# Patient Record
Sex: FEMALE | Race: WHITE | URBAN - NONMETROPOLITAN AREA
[De-identification: names, ages, dates, MRNs, and addresses within clinical notes are randomized per-mention and may not be internally consistent; named-entity substitution may affect disease eponyms.]

---

## 2018-03-15 ENCOUNTER — APPOINTMENT (RX ONLY)
Dept: URBAN - NONMETROPOLITAN AREA CLINIC 35 | Facility: CLINIC | Age: 33
Setting detail: DERMATOLOGY
End: 2018-03-15

## 2018-03-15 DIAGNOSIS — Q826 OTHER SPECIFIED ANOMALIES OF SKIN: ICD-10-CM

## 2018-03-15 DIAGNOSIS — L30.8 OTHER SPECIFIED DERMATITIS: ICD-10-CM

## 2018-03-15 DIAGNOSIS — Q819 OTHER SPECIFIED ANOMALIES OF SKIN: ICD-10-CM

## 2018-03-15 DIAGNOSIS — L85.3 XEROSIS CUTIS: ICD-10-CM

## 2018-03-15 DIAGNOSIS — Q828 OTHER SPECIFIED ANOMALIES OF SKIN: ICD-10-CM

## 2018-03-15 DIAGNOSIS — D22 MELANOCYTIC NEVI: ICD-10-CM

## 2018-03-15 DIAGNOSIS — L70.8 OTHER ACNE: ICD-10-CM

## 2018-03-15 DIAGNOSIS — Z12.83 ENCOUNTER FOR SCREENING FOR MALIGNANT NEOPLASM OF SKIN: ICD-10-CM

## 2018-03-15 PROBLEM — D22.39 MELANOCYTIC NEVI OF OTHER PARTS OF FACE: Status: ACTIVE | Noted: 2018-03-15

## 2018-03-15 PROBLEM — D22.5 MELANOCYTIC NEVI OF TRUNK: Status: ACTIVE | Noted: 2018-03-15

## 2018-03-15 PROBLEM — L40.0 PSORIASIS VULGARIS: Status: ACTIVE | Noted: 2018-03-15

## 2018-03-15 PROBLEM — D22.71 MELANOCYTIC NEVI OF RIGHT LOWER LIMB, INCLUDING HIP: Status: ACTIVE | Noted: 2018-03-15

## 2018-03-15 PROBLEM — Q82.8 OTHER SPECIFIED CONGENITAL MALFORMATIONS OF SKIN: Status: ACTIVE | Noted: 2018-03-15

## 2018-03-15 PROCEDURE — ? COUNSELING

## 2018-03-15 PROCEDURE — ? TREATMENT REGIMEN

## 2018-03-15 PROCEDURE — ? OBSERVATION

## 2018-03-15 PROCEDURE — ? PRESCRIPTION

## 2018-03-15 PROCEDURE — ? IN-HOUSE DISPENSING PHARMACY

## 2018-03-15 PROCEDURE — 99203 OFFICE O/P NEW LOW 30 MIN: CPT

## 2018-03-15 PROCEDURE — ? EDUCATIONAL RESOURCES PROVIDED

## 2018-03-15 RX ORDER — TRIAMCINOLONE ACETONIDE 0.1 %
OINTMENT (GRAM) TOPICAL
Qty: 1 | Refills: 1 | Status: ERX | COMMUNITY
Start: 2018-03-15

## 2018-03-15 RX ADMIN — Medication: at 00:00

## 2018-03-15 ASSESSMENT — LOCATION DETAILED DESCRIPTION DERM
LOCATION DETAILED: RIGHT SUPERIOR MEDIAL MIDBACK
LOCATION DETAILED: RIGHT PROXIMAL POSTERIOR UPPER ARM
LOCATION DETAILED: RIGHT PROXIMAL DORSAL RING FINGER
LOCATION DETAILED: LEFT SUPERIOR UPPER BACK
LOCATION DETAILED: RIGHT INFERIOR LATERAL MALAR CHEEK
LOCATION DETAILED: LEFT PROXIMAL DORSAL RING FINGER
LOCATION DETAILED: RIGHT MEDIAL PLANTAR MIDFOOT
LOCATION DETAILED: LEFT PROXIMAL POSTERIOR UPPER ARM

## 2018-03-15 ASSESSMENT — LOCATION ZONE DERM
LOCATION ZONE: ARM
LOCATION ZONE: FACE
LOCATION ZONE: TRUNK
LOCATION ZONE: FINGER
LOCATION ZONE: FEET

## 2018-03-15 ASSESSMENT — LOCATION SIMPLE DESCRIPTION DERM
LOCATION SIMPLE: RIGHT PLANTAR SURFACE
LOCATION SIMPLE: RIGHT RING FINGER
LOCATION SIMPLE: RIGHT LOWER BACK
LOCATION SIMPLE: LEFT RING FINGER
LOCATION SIMPLE: RIGHT CHEEK
LOCATION SIMPLE: RIGHT POSTERIOR UPPER ARM
LOCATION SIMPLE: LEFT POSTERIOR UPPER ARM
LOCATION SIMPLE: LEFT UPPER BACK

## 2018-03-15 NOTE — PROCEDURE: IN-HOUSE DISPENSING PHARMACY
Product 35 Refills: 0
Product 50 Unit Type: mg
Product 18 Unit Type: ml
Product 5 Amount/Unit (Numbers Only): 60
Product 23 Refills: 11
Product 8 Price/Unit (In Dollars): 42.00
Product 6 Price/Unit (In Dollars): 40.00
Product 2 Units Dispensed: 1
Name Of Product 19: Bruise Healing Cream \\nArnica 2%/Ascorbic Acid 20%/Bromelain 5% /Niacinamide 4%/Phytonadione 1%
Product 22 Application Directions: AAA every night
Product 12 Application Directions: Wash the scalp nightly when flared and 3x a week for maintenance. Let sit for several minutes before rinsing
Name Of Product 9: Clob 0.05% Ointment \\nClobetasol Propionate 0.05%/Niacinamide 4%
Product 24 Price/Unit (In Dollars): 45.00
Product 13 Price/Unit (In Dollars): 53.00
Send Charges To Patient Encounter: Yes
Product 1 Amount/Unit (Numbers Only): 30
Product 9 Application Directions: AAA BID x 2-3 weeks out of every month prn
Name Of Product 7: Anti-Fungal Econaz 1% Cream\\nEconazole Nitrate 1%/Niacinamide 4%
Name Of Product 14: Hydrocort 2.5%/Keto 2% Fungal Cream \\nHydrocortisone 2.5%/Ketoconazole 2%
Name Of Product 11: Mometasone 0.1% w/ HA\\nHyaluronic Acid 2%/Mometasone Furoate 0.1% /Niacinamide 4%
Name Of Product 20: Tret 0.05% w/ HA\\nHyaluronic Acid 0.5%/Niacinamide 4%/Tretinoin 0.05%
Product 9 Unit Type: grams
Name Of Product 16: Ivermec 1% / Metron 1% Rosacea Gel \\nIvermectin 1%/Metronidazole 1%/Niacinamide 4% /Potassium Azeloyl Diglycinate 5%
Product 10 Price/Unit (In Dollars): 48.00
Product 5 Application Directions: Apply to scalp once daily.
Name Of Product 6: Anti-Fungal Nail Solution \\nCiclopirox 8%/Fluconazole 1%/Terbinafine 1%
Product 15 Application Directions: Apply 1-2 pumps topically to the face once daily.
Name Of Product 13: Tacro 0.1% Ointment\\nNiacinamide 4%/Tacrolimus 0.1%
Product 24 Application Directions: Apply to face qam
Product 21 Application Directions: Wet skin and apply to the affected areas. Massage gently for 10 seconds, working into a full lather. Rinse well and pat dry.
Product 20 Refills: 6
Name Of Product 23: Latisse
Name Of Product 15: Metron 1% Rosacea Gel \\nMetronidazole 1%/Niacinamide 4%
Product 17 Application Directions: Apply 1-2 pumps topically to the affected areas one or two times daily.
Product 23 Application Directions: Apply to upper lash line nightly
Name Of Product 8: Clob 0.05% Topical Solution \\nClobetasol Propionate 0.05%/Niacinamide 4%
Product 18 Application Directions: Apply to warts HS
Product 12 Amount/Unit (Numbers Only): 120
Name Of Product 10: Desox 0.025% Ointment\\nDesoximatasone 0.25%/Niacinamide 4%
Product 20 Application Directions: Apply topically to face nightly or as tolerated.
Product 8 Refills: 2
Name Of Product 17: Scar Silicone Gel \\nPentoxifylline 0.5%/Tranilast 1%/Triamcinolone Acetonide 0.1%/Zinc Oxide 5%
Name Of Product 4: Alopecia Topical Solution For Men HP\\nFinasteride 0.1%/Minoxidil 7%
Name Of Product 12: Anti-Fungal Keto 2% Shampoo\\nKetoconazole 2%/Zinc Pyrithione 1%/Salicylic Acid 2%
Product 7 Application Directions: Apply to both feet and in between toes qhs
Name Of Product 24: Azelaic Acid Cream\\nAzelaic Acid 15%/Niacinamide 4%
Product 21 Refills: 10
Product 14 Application Directions: AAA 1-2 x daily
Name Of Product 5: Alopecia Topical Solution For Women\\nMinoxidil 7%/Progesterone 0.1%
Product 6 Application Directions: Apply to affected toenails daily
Name Of Product 2: Dapsone 6%/Niacinamide 2%/Spironolactone 5%
Name Of Product 21: Acne Rosacea Face & Body Cleanser \\nSodium Sulfacetamide 8%/Sulfur 4%
Name Of Product 22: [place] Imiquim 5% / Levocetir 1% Gel \\nImiquimod 5%/Levocetirizine 1%/Niacinamide 2%
Product 1 Application Directions: Apply to face every morning.
Name Of Product 1: Dap 6% Acne Gel\\nDapsone 6%/Niacinamide 4%
Detail Level: Simple
Product 13 Application Directions: Apply 1-2 pumps topically to affected areas on face twice daily
Product 16 Application Directions: Apply topically to face 1-2 x daily
Product 2 Refills: 5
Name Of Product 3: BP 5% w/ Tret 0.025% Gel\\nBenzoyl Peroxide 5%/Niacinamide 2%/Tretinoin 0.025%
Product 2 Application Directions: Apply topically to the face every morning
Product 6 Amount/Unit (Numbers Only): 15
Name Of Product 18: Wart Gel \\nCimetidine 5%/Ibuprofen 2%/Salicylic Acid 17%
Product 4 Application Directions: Apply to scalp daily

## 2018-03-15 NOTE — HPI: BUMPS
Have Your Bumps Been Treated?: not been treated
Is This A New Presentation, Or A Follow-Up?: Bumps
Additional History: Pt was told long ago this was Keratosis Pilaris, she has tried Amlactin in the past but this did not help.

## 2018-03-15 NOTE — HPI: SKIN LESION
Has Your Skin Lesion Been Treated?: not been treated
Is This A New Presentation, Or A Follow-Up?: Mole
Additional History: Pt notes this mole is a bit darker than the rest, she has noticed no changes over many years.

## 2018-03-15 NOTE — HPI: PIMPLES (ACNE)
Is This A New Presentation, Or A Follow-Up?: Acne
Additional Comments (Use Complete Sentences): Pt has never had acne before, just in the last few months she has been breaking out primarily along the jawline.  She currently washes her face with Dove soap and a wash cloth and applies Lubriderm lotion and makeup.

## 2018-03-15 NOTE — HPI: RASH
Is This A New Presentation, Or A Follow-Up?: Rash
Additional History: Pt gets breakout on her hands every spring, she believes it is psoriasis, it only ever shows up on her hands.  She has never tried any treatment, it does not particularly bother her.

## 2018-03-15 NOTE — PROCEDURE: TREATMENT REGIMEN
Detail Level: Zone
Detail Level: Detailed
Initiate Treatment: CeraVe SA
Initiate Treatment: [place]//Dapsone gel qd-bid, sulfur cleanser qd-bid
Initiate Treatment: CeraVe moisturizer qd-bid
Initiate Treatment: Gentle cleanser for sensitive skin, moisturize every time after washing hands, TAC bid prn up to 3 weeks of the month
Plan: Check all products for comedogenicity.\\nPatient would like to avoid systemic medications if possible.\\nFollow up in 6 weeks for evaluation of progress, sooner prn if symptoms worsen
Detail Level: Generalized
Discontinue Regimen: Washing with washcloth

## 2018-03-15 NOTE — PROCEDURE: MIPS QUALITY
Quality 130: Documentation Of Current Medications In The Medical Record: Current Medications Documented
Quality 226: Preventive Care And Screening: Tobacco Use: Screening And Cessation Intervention: Patient screened for tobacco and is an ex-smoker
Detail Level: Detailed

## 2019-03-27 ENCOUNTER — APPOINTMENT (RX ONLY)
Dept: URBAN - NONMETROPOLITAN AREA CLINIC 35 | Facility: CLINIC | Age: 34
Setting detail: DERMATOLOGY
End: 2019-03-27

## 2019-03-27 DIAGNOSIS — Z12.83 ENCOUNTER FOR SCREENING FOR MALIGNANT NEOPLASM OF SKIN: ICD-10-CM

## 2019-03-27 DIAGNOSIS — D22 MELANOCYTIC NEVI: ICD-10-CM

## 2019-03-27 DIAGNOSIS — L70.0 ACNE VULGARIS: ICD-10-CM

## 2019-03-27 DIAGNOSIS — D22 MELANOCYTIC NEVI: ICD-10-CM | Status: STABLE

## 2019-03-27 DIAGNOSIS — L81.4 OTHER MELANIN HYPERPIGMENTATION: ICD-10-CM

## 2019-03-27 PROBLEM — D22.5 MELANOCYTIC NEVI OF TRUNK: Status: ACTIVE | Noted: 2019-03-27

## 2019-03-27 PROBLEM — D22.39 MELANOCYTIC NEVI OF OTHER PARTS OF FACE: Status: ACTIVE | Noted: 2019-03-27

## 2019-03-27 PROBLEM — D22.71 MELANOCYTIC NEVI OF RIGHT LOWER LIMB, INCLUDING HIP: Status: ACTIVE | Noted: 2019-03-27

## 2019-03-27 PROCEDURE — ? IN-HOUSE DISPENSING PHARMACY

## 2019-03-27 PROCEDURE — ? COUNSELING

## 2019-03-27 PROCEDURE — ? OBSERVATION

## 2019-03-27 PROCEDURE — 99213 OFFICE O/P EST LOW 20 MIN: CPT

## 2019-03-27 PROCEDURE — ? TREATMENT REGIMEN

## 2019-03-27 PROCEDURE — ? COSMETIC CONSULTATION - PHOTOREJUVENATION

## 2019-03-27 PROCEDURE — ? MEDICAL CONSULTATION: CHEMICAL PEELS

## 2019-03-27 PROCEDURE — ? PRODUCT LINE (HYPERPIGMENTATION)

## 2019-03-27 ASSESSMENT — LOCATION SIMPLE DESCRIPTION DERM
LOCATION SIMPLE: RIGHT PLANTAR SURFACE
LOCATION SIMPLE: RIGHT CHEEK
LOCATION SIMPLE: LEFT CHEEK
LOCATION SIMPLE: UPPER BACK

## 2019-03-27 ASSESSMENT — LOCATION DETAILED DESCRIPTION DERM
LOCATION DETAILED: LEFT INFERIOR CENTRAL MALAR CHEEK
LOCATION DETAILED: SUPERIOR THORACIC SPINE
LOCATION DETAILED: RIGHT CENTRAL MALAR CHEEK
LOCATION DETAILED: RIGHT INFERIOR CENTRAL MALAR CHEEK
LOCATION DETAILED: RIGHT LATERAL PLANTAR MIDFOOT
LOCATION DETAILED: LEFT CENTRAL MALAR CHEEK

## 2019-03-27 ASSESSMENT — LOCATION ZONE DERM
LOCATION ZONE: FEET
LOCATION ZONE: FACE
LOCATION ZONE: TRUNK

## 2019-03-27 ASSESSMENT — SEVERITY ASSESSMENT OVERALL AMONG ALL PATIENTS
IN YOUR EXPERIENCE, AMONG ALL PATIENTS YOU HAVE SEEN WITH THIS CONDITION, HOW SEVERE IS THIS PATIENT'S CONDITION?: NORMAL

## 2019-03-27 NOTE — PROCEDURE: TREATMENT REGIMEN
Continue Regimen: Sulfur Cleanser 1-2 x daily
Initiate Treatment: Antioxidant SPF QAM\\nTret w/ HA QHS as tolerated (mixed with Fade 12 as described above for lentigines)
Detail Level: Zone
Initiate Treatment: Antioxidant sunblock QAM\\nMix Fade 12 (2% hydroquinone) and 0.05%tretinoin with HA QHS as tolerated\\nNoncomedogenic moisturizer
Plan: Follow up for IPL vs. Jessners peels PRN

## 2019-03-27 NOTE — PROCEDURE: PRODUCT LINE (HYPERPIGMENTATION)
Product 46 Units: 0
Name Of Product 6: VITAMIN C LIGHTENING CREAM
Product 36 Price (In Dollars - Numeric Only, No Special Characters Or $): 0.00
Product 2 Price (In Dollars - Numeric Only, No Special Characters Or $): 50.00
Product 4 Application Directions: Apply to face qpm
Name Of Product 4: Tretinol
Product 6 Application Directions: Apply to face QAM
Product 5 Price (In Dollars - Numeric Only, No Special Characters Or $): 79.00
Detail Level: Zone
Allow Plan To Count Towards E/M Coding: Yes
Name Of Product 1: Yusraiant
Product 5 Application Directions: AAA as tolerated
Name Of Product 5: Fade-12
Product 1 Application Directions: Apply to face qohs-qhs as tolerated
Product 1 Price (In Dollars - Numeric Only, No Special Characters Or $): 77.00
Product 2 Application Directions: Apply to face qam under sunscreen
Name Of Product 3: Pure MD June-C Lightening Serum (4% Hydroquinone, 10% Vitamin C, and 1% Vitamin E)
Product 3 Application Directions: Apply to face every morning.
Product 5 Units: 1
Product 6 Price (In Dollars - Numeric Only, No Special Characters Or $): 78.00
Name Of Product 2: Radiant C cream

## 2019-03-27 NOTE — HPI: PREVENTATIVE SKIN CHECK
What Is The Reason For Today's Visit?: Preventative Skin Check
Additional History: Pt denies any new or changing lesions of malignant concern. Pt reports having a mole on her right cheek for years that is slightly darker in the center. Pt wants to make sure it looks okay.

## 2019-03-27 NOTE — PROCEDURE: IN-HOUSE DISPENSING PHARMACY
Product 62 Price/Unit (In Dollars): 0
Product 42 Unit Type: mg
Product 1 Refills: 7
Product 17 Application Directions: Apply 1-2 pumps topically to the affected areas QD M-F
Product 17 Refills: 5
Product 23 Application Directions: Apply to upper lash line nightly
Product 2 Amount/Unit (Numbers Only): 30
Product 11 Price/Unit (In Dollars): 42.00
Name Of Product 24: Azelaic Acid Cream\\nAzelaic Acid 15%/Niacinamide 4%
Product 4 Refills: 11
Product 4 Price/Unit (In Dollars): 45.00
Product 9 [place]/Unit (In Dollars): 48.00
Product 7 Unit Type: ml
Product 20 Application Directions: Apply topically to neck 2-7 nights per week as tolerated
Product 14 Application Directions: Apply 1-2 pumps topically to the affected areas on body twice daily until clear
Name Of Product 20: Tret 0.05% w/ HA Cream\\nHyaluronic Acid 0.5%/Niacinamide 4%/Tretinoin 0.05%
Name Of Product 22: [place] Imiquim 5% / Levocetir 1% Gel \\nImiquimod 5%/Levocetirizine 1%/Niacinamide 2%
Name Of Product 2: Dapsone 6%/Niacinamide 2%/Spironolactone 5%
Name Of Product 6: Anti-Fungal Nail Solution \\nCiclopirox 8%/Fluconazole 1%/Terbinafine 1%
Name Of Product 12: Anti-Fungal Keto 2% Shampoo\\nKetoconazole 2%/Zinc Pyrithione 1%/Salicylic Acid 2%
Product 8 Refills: 6
Product 4 Amount/Unit (Numbers Only): 60
Name Of Product 23: Latisse
Product 12 Amount/Unit (Numbers Only): 120
Name Of Product 21: Acne Rosacea Face & Body Cleanser \\nSodium Sulfacetamide Monohydrate 8%/Sulfur Precipitated 4%/Salicylic Acid 2%
Product 16 Application Directions: Apply 1-2 pumps topically to the face once daily
Name Of Product 8: Clob 0.05% Topical Solution \\nClobetasol Propionate 0.05%/Niacinamide 4%
Product 20 Units Dispensed: 1
Name Of Product 1: Dap 6% Acne Gel\\nDapsone 6%/Niacinamide 4%
Product 25 Application Directions: Apply to face QHS as tolerated
Name Of Product 3: BP 5% w/ Tret 0.025% Gel\\nBenzoyl Peroxide 5%/Niacinamide 2%/Tretinoin 0.025%
Product 8 Application Directions: Apply to AA BID Monday-Thursday
Product 6 Application Directions: Apply topically to the affected nail(s) and surrounding areas one time daily. May precipitate.
Render Refills If Set To 0: Yes
Product 18 Price/Unit (In Dollars): 40.00
Product 25 Refills: 4
Name Of Product 5: Alopecia Topical Solution For Women\\nMinoxidil 7%/Progesterone 0.1%
Name Of Product 16: Ivermec 1% / Metron 1% Rosacea Gel \\nIvermectin 1%/Metronidazole 1%/Niacinamide 4% /Potassium Azeloyl Diglycinate 5%
Product 6 Amount/Unit (Numbers Only): 15
Product 22 Application Directions: Apply 1-2 pumps topically to the affected area at night. Washing off in AM.
Product 21 Application Directions: Wet skin and apply to the affected areas. Massage gently for 10 seconds, working into a full lather. Rinse well and pat dry. 1-2 x daily.
Product 1 Application Directions: Apply 1-2 pumps topically two times daily
Product 12 Application Directions: Apply to the scalp in the shower, letting sit for several minutes before rinsing off
Product 13 Application Directions: Apply 1-2 pumps topically to affected areas two times daily Monday-Thursday.
Name Of Product 14: Hydrocort 2.5%/Keto 2% Fungal Cream \\nHydrocortisone 2.5%/Ketoconazole 2%
Product 2 Application Directions: Apply 1-2 pumps topically to face every morning
Product 9 Unit Type: grams
Name Of Product 15: Metron 1% Rosacea Gel \\nMetronidazole 1%/Niacinamide 4%
Name Of Product 7: Anti-Fungal Econaz 1% Cream\\nEconazole Nitrate 1%/Niacinamide 4%
Product 9 Refills: 3
Product 10 Refills: 2
Product 7 Application Directions: Apply 1-2 pumps topically to affected areas on feet twice daily
Name Of Product 18: Wart Gel \\nCimetidine 5%/Ibuprofen 2%/Salicylic Acid 17%
Name Of Product 9: Clob 0.05% Ointment \\nClobetasol Propionate 0.05%/Niacinamide 4%
Product 18 Application Directions: Apply 1-2 pumps topically to affected area every morning.
Product 5 Application Directions: Apply topically to the affected areas one time daily
Detail Level: Zone
Name Of Product 4: Alopecia Topical Solution For Men HP\\nFinasteride 0.1%/Minoxidil 7%
Product 4 Application Directions: Apply to scalp daily
Name Of Product 17: Scar Silicone Gel \\nPentoxifylline 0.5%/Tranilast 1%/Triamcinolone Acetonide 0.1%/Zinc Oxide 5%
Product 15 Application Directions: Apply 1-2 pumps topically to the face 1-2 x daily.
Name Of Product 10: Desox 0.025% Ointment\\nDesoximatasone 0.25%/Niacinamide 4%
Product 9 Application Directions: Apply 1-2 pumps topically to affected areas twice daily x 3 weeks, breaking 1 week off before repeating again
Product 13 Price/Unit (In Dollars): 53.00
Name Of Product 19: Bruise Healing Cream \\nArnica 2%/Ascorbic Acid 20%/Bromelain 5% /Niacinamide 4%/Phytonadione 1%
Product 10 Application Directions: Apply 1-2 pumps topically to the affected areas on legs twice daily for 3 weeks, breaking 1 week before repeating again
Name Of Product 11: Mometasone 0.1% w/ HA\\nHyaluronic Acid 2%/Mometasone Furoate 0.1% /Niacinamide 4%
Name Of Product 13: Tacro 0.1% Ointment\\nNiacinamide 4%/Tacrolimus 0.1%
Name Of Product 25: Adapalene Triple Combo Acne gel
Product 24 Application Directions: Apply 1-2 pumps topically to affected areas on face twice daily.

## 2019-03-27 NOTE — HPI: COSMETIC CONSULTATION
Additional History: Pt reports having brown spots on both cheek bones that bother her d/t cosmetic reasons. She would like to discuss possible treatment options.

## 2021-08-27 ENCOUNTER — RX ONLY (OUTPATIENT)
Age: 36
Setting detail: RX ONLY
End: 2021-08-27

## 2021-08-27 RX ORDER — SODIUM SULFACETAMIDE, SULFUR 100; 50 MG/G; MG/G
SOLUTION TOPICAL
Qty: 340 | Refills: 0 | Status: ERX | COMMUNITY
Start: 2021-08-27

## 2022-04-06 ENCOUNTER — APPOINTMENT (RX ONLY)
Dept: URBAN - NONMETROPOLITAN AREA CLINIC 34 | Facility: CLINIC | Age: 37
Setting detail: DERMATOLOGY
End: 2022-04-06

## 2022-04-06 VITALS — TEMPERATURE: 98.1 F

## 2022-04-06 DIAGNOSIS — D22 MELANOCYTIC NEVI: ICD-10-CM | Status: UNCHANGED

## 2022-04-06 DIAGNOSIS — D22 MELANOCYTIC NEVI: ICD-10-CM

## 2022-04-06 DIAGNOSIS — L71.0 PERIORAL DERMATITIS: ICD-10-CM

## 2022-04-06 PROBLEM — D22.71 MELANOCYTIC NEVI OF RIGHT LOWER LIMB, INCLUDING HIP: Status: ACTIVE | Noted: 2022-04-06

## 2022-04-06 PROBLEM — D48.5 NEOPLASM OF UNCERTAIN BEHAVIOR OF SKIN: Status: ACTIVE | Noted: 2022-04-06

## 2022-04-06 PROCEDURE — ? COUNSELING

## 2022-04-06 PROCEDURE — ? SHAVE REMOVAL

## 2022-04-06 PROCEDURE — 99213 OFFICE O/P EST LOW 20 MIN: CPT | Mod: 25

## 2022-04-06 PROCEDURE — ? PRESCRIPTION

## 2022-04-06 PROCEDURE — 11300 SHAVE SKIN LESION 0.5 CM/<: CPT

## 2022-04-06 RX ORDER — DOXYCYCLINE HYCLATE 20 MG/1
TABLET, FILM COATED ORAL
Qty: 60 | Refills: 1 | Status: ERX | COMMUNITY
Start: 2022-04-06

## 2022-04-06 RX ADMIN — DOXYCYCLINE HYCLATE: 20 TABLET, FILM COATED ORAL at 00:00

## 2022-04-06 ASSESSMENT — LOCATION DETAILED DESCRIPTION DERM
LOCATION DETAILED: LEFT CHIN
LOCATION DETAILED: PHILTRUM
LOCATION DETAILED: RIGHT INFERIOR UPPER BACK
LOCATION DETAILED: LEFT SUPERIOR MEDIAL BUCCAL CHEEK
LOCATION DETAILED: RIGHT LATERAL PLANTAR MIDFOOT
LOCATION DETAILED: RIGHT SUPERIOR MEDIAL BUCCAL CHEEK
LOCATION DETAILED: RIGHT CHIN

## 2022-04-06 ASSESSMENT — LOCATION SIMPLE DESCRIPTION DERM
LOCATION SIMPLE: UPPER LIP
LOCATION SIMPLE: LEFT CHEEK
LOCATION SIMPLE: RIGHT PLANTAR SURFACE
LOCATION SIMPLE: CHIN
LOCATION SIMPLE: RIGHT CHEEK
LOCATION SIMPLE: RIGHT UPPER BACK

## 2022-04-06 ASSESSMENT — LOCATION ZONE DERM
LOCATION ZONE: FACE
LOCATION ZONE: FEET
LOCATION ZONE: TRUNK
LOCATION ZONE: LIP

## 2022-04-06 NOTE — HPI: RASH (PERIORAL DERMATITIS)
Is This A New Presentation, Or A Follow-Up?: Rash
Additional History: Patient states this rash appears every winter and typically goes away, but she would like this evaluated. \\nShe has tried and failed metrogel.

## 2022-04-06 NOTE — PROCEDURE: SHAVE REMOVAL
Medical Necessity Information: It is in your best interest to select a reason for this procedure from the list below. All of these items fulfill various CMS LCD requirements except the new and changing color options.
Medical Necessity Clause: This procedure was medically necessary because the lesion that was treated was:
Lab: [id number]
Lab Facility: [id number]
Detail Level: Detailed
Was A Bandage Applied: Yes
Size Of Lesion In Cm (Required): 0.2
X Size Of Lesion In Cm (Optional): 0
Biopsy Method: Personna blade
Anesthesia Type: 1% lidocaine with 1:100,000 epinephrine and a 1:10 solution of 8.4% sodium bicarbonate
Hemostasis: Drysol
Wound Care: Petrolatum
Render Path Notes In Note?: No
Consent was obtained from the patient. The risks and benefits to therapy were discussed in detail. Specifically, the risks of infection, scarring, bleeding, prolonged wound healing, incomplete removal, allergy to anesthesia, nerve injury and recurrence were addressed. Prior to the procedure, the treatment site was clearly identified and confirmed by the patient. All components of Universal Protocol/PAUSE Rule completed.
Post-Care Instructions: I reviewed with the patient in detail post-care instructions. Patient is to keep the biopsy site dry overnight, and then apply bacitracin twice daily until healed. Patient may apply hydrogen peroxide soaks to remove any crusting.
Notification Instructions: Patient will be notified of pathology results. However, patient instructed to call the office if not contacted within 2 weeks.
Billing Type: Third-Party Bill

## 2025-08-27 ENCOUNTER — APPOINTMENT (OUTPATIENT)
Dept: URBAN - NONMETROPOLITAN AREA CLINIC 34 | Facility: CLINIC | Age: 40
Setting detail: DERMATOLOGY
End: 2025-08-27

## 2025-08-27 DIAGNOSIS — L82.1 OTHER SEBORRHEIC KERATOSIS: ICD-10-CM

## 2025-08-27 PROCEDURE — ? COUNSELING

## 2025-08-27 ASSESSMENT — LOCATION SIMPLE DESCRIPTION DERM
LOCATION SIMPLE: RIGHT THIGH
LOCATION SIMPLE: RIGHT SCALP

## 2025-08-27 ASSESSMENT — LOCATION ZONE DERM
LOCATION ZONE: LEG
LOCATION ZONE: SCALP

## 2025-08-27 ASSESSMENT — LOCATION DETAILED DESCRIPTION DERM
LOCATION DETAILED: RIGHT ANTERIOR DISTAL THIGH
LOCATION DETAILED: RIGHT MEDIAL FRONTAL SCALP